# Patient Record
Sex: MALE | Race: WHITE | Employment: STUDENT | URBAN - METROPOLITAN AREA
[De-identification: names, ages, dates, MRNs, and addresses within clinical notes are randomized per-mention and may not be internally consistent; named-entity substitution may affect disease eponyms.]

---
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
## 2022-03-19 PROBLEM — F32.A DEPRESSION: Status: ACTIVE | Noted: 2019-03-22

## 2022-03-19 PROBLEM — R53.83 FATIGUE: Status: ACTIVE | Noted: 2019-03-22

## 2022-03-19 PROBLEM — F41.9 ANXIETY: Status: ACTIVE | Noted: 2019-03-22

## 2022-09-14 ENCOUNTER — HOSPITAL ENCOUNTER (EMERGENCY)
Age: 22
Discharge: HOME | End: 2022-09-14
Attending: EMERGENCY MEDICINE
Payer: COMMERCIAL

## 2022-09-14 VITALS
WEIGHT: 212.08 LBS | RESPIRATION RATE: 16 BRPM | DIASTOLIC BLOOD PRESSURE: 92 MMHG | BODY MASS INDEX: 25.83 KG/M2 | HEART RATE: 81 BPM | OXYGEN SATURATION: 97 % | SYSTOLIC BLOOD PRESSURE: 129 MMHG | HEIGHT: 76 IN | TEMPERATURE: 97.1 F
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
## 2022-09-14 PROCEDURE — 99283 EMERGENCY DEPT VISIT LOW MDM: CPT

## 2022-09-14 RX ORDER — IBUPROFEN 800 MG/1
800 TABLET ORAL
Qty: 20 TABLET | Refills: 0 | Status: SHIPPED | OUTPATIENT
Start: 2022-09-14 | End: 2022-09-21

## 2022-09-14 NOTE — ED TRIAGE NOTES
Pt has had ring stuck on R middle finger x2 hours. Went to [facility] where they attempted to remove with lubricant and ring cutter without success. Ring is made [employer].

## 2022-09-14 NOTE — ED NOTES
Pt provided w/DC instructions. New rx for motrin. Pt verbalized understanding of DC instructions. Denied questions/concerns.

## 2022-09-14 NOTE — ED NOTES
Ring cut with use of vice  pliers by Dr. Dorys Parker after multiple attempts to remove ring w/umbilical tape, elastic band, lubricant, and mineral oil.

## 2022-09-14 NOTE — ED PROVIDER NOTES
The history is provided by the patient. Finger Swelling   This is a new problem. The current episode started 1 to 2 hours ago. The problem occurs constantly. The problem has been gradually worsening. Pain location: right middle finger. The pain is mild. Associated symptoms comments: Swelling, unable to remove ring. Treatments tried: lubricant, cutting, umbilical tape wrapping. The treatment provided no relief. There has been no history of extremity trauma. Past Medical History:   Diagnosis Date    Elbow fracture, left     Insomnia        No past surgical history on file. Family History:   Problem Relation Age of Onset    Bipolar Disorder Mother     Cancer Father     Heart Disease Paternal Uncle     Bipolar Disorder Maternal Grandmother     Alcohol abuse Paternal Grandmother     Cancer Paternal Grandfather        Social History     Socioeconomic History    Marital status: SINGLE     Spouse name: Not on file    Number of children: Not on file    Years of education: Not on file    Highest education level: Not on file   Occupational History    Not on file   Tobacco Use    Smoking status: Never    Smokeless tobacco: Never   Substance and Sexual Activity    Alcohol use: Never    Drug use: Yes     Types: Marijuana    Sexual activity: Yes     Partners: Female   Other Topics Concern    Not on file   Social History Narrative    Not on file     Social Determinants of Health     Financial Resource Strain: Not on file   Food Insecurity: Not on file   Transportation Needs: Not on file   Physical Activity: Not on file   Stress: Not on file   Social Connections: Not on file   Intimate Partner Violence: Not on file   Housing Stability: Not on file         ALLERGIES: Patient has no known allergies. Review of Systems   All other systems reviewed and are negative.     Vitals:    09/14/22 1403   BP: (!) 129/92   Pulse: 81   Resp: 16   Temp: 97.1 °F (36.2 °C)   SpO2: 97%   Weight: 96.2 kg (212 lb 1.3 oz)   Height: 6' 4\" (1.93 m)            Physical Exam  Vitals and nursing note reviewed. Constitutional:       General: He is not in acute distress. Appearance: He is well-developed. HENT:      Head: Normocephalic and atraumatic. Eyes:      Conjunctiva/sclera: Conjunctivae normal.   Neck:      Trachea: No tracheal deviation. Cardiovascular:      Rate and Rhythm: Normal rate and regular rhythm. Pulmonary:      Effort: Pulmonary effort is normal. No respiratory distress. Abdominal:      General: There is no distension. Musculoskeletal:         General: No deformity. Normal range of motion. Cervical back: Neck supple. Comments: Right middle finger swelling with constricting metal ring overlying proximal phalanx   Skin:     General: Skin is warm and dry. Neurological:      Mental Status: He is alert. Cranial Nerves: No cranial nerve deficit. Psychiatric:         Behavior: Behavior normal.        MDM       25 y.o. male presents with ring stuck on his middle finger with subsequent swelling and inability to remove. Attempted to save ring but unable to pass over the PIP with multiple techniques. Ultimately it was broken in to 2 pieces and removed with vice  pliers. NVI following removal. Plan to follow up with PCP as needed and return precautions discussed for worsening or new concerning symptoms.      Foreign Body Removal    Date/Time: 9/14/2022 3:27 PM  Performed by: Howie Castellanos MD  Authorized by: Howie Castellanos MD     Consent:     Consent obtained:  Verbal    Consent given by:  Patient    Risks, benefits, and alternatives were discussed: yes      Risks discussed:  Pain, worsening of condition and incomplete removal    Alternatives discussed:  Referral and delayed treatment  Universal protocol:     Procedure explained and questions answered to patient or proxy's satisfaction: yes      Relevant documents present and verified: yes      Site/side marked: yes      Immediately prior to procedure, a time out was called: yes      Patient identity confirmed:  Verbally with patient, hospital-assigned identification number, provided demographic data and arm band  Location:     Location:  Finger    Finger location:  R middle finger  Pre-procedure details:     Imaging:  None    Neurovascular status: intact      Preparation: Patient was prepped and draped in usual sterile fashion    Anesthesia:     Anesthesia method:  None  Procedure type:     Procedure complexity:  Simple  Procedure details:     Localization method:  Visualized    Removal mechanism: ring broken with vice  pliers incrementally adjusted.     Foreign bodies recovered:  1    Description:  Ring in 2 pieces    Intact foreign body removal: yes    Post-procedure details:     Neurovascular status: intact      Confirmation:  No additional foreign bodies on visualization    Procedure completion:  Tolerated well, no immediate complications

## 2025-03-20 RX ORDER — ONDANSETRON 8 MG/1
8 TABLET, ORALLY DISINTEGRATING ORAL EVERY 8 HOURS PRN
Qty: 30 TABLET | Refills: 0 | Status: SHIPPED | OUTPATIENT
Start: 2025-03-20